# Patient Record
Sex: FEMALE | Race: WHITE
[De-identification: names, ages, dates, MRNs, and addresses within clinical notes are randomized per-mention and may not be internally consistent; named-entity substitution may affect disease eponyms.]

---

## 2017-03-25 ENCOUNTER — HOSPITAL ENCOUNTER (OUTPATIENT)
Dept: HOSPITAL 45 - C.LAB | Age: 67
Discharge: HOME | End: 2017-03-25
Attending: INTERNAL MEDICINE
Payer: COMMERCIAL

## 2017-03-25 DIAGNOSIS — E78.5: ICD-10-CM

## 2017-03-25 DIAGNOSIS — R19.7: Primary | ICD-10-CM

## 2017-03-25 LAB
BASOPHILS # BLD: 0.02 K/UL (ref 0–0.2)
BASOPHILS NFR BLD: 0.5 %
CHOLEST/HDLC SERPL: 3.1 {RATIO}
COMPLETE: YES
EOSINOPHIL NFR BLD AUTO: 185 K/UL (ref 130–400)
GLUCOSE UR QL: 58 MG/DL
HCT VFR BLD CALC: 40.3 % (ref 37–47)
IG%: 0.2 %
IMM GRANULOCYTES NFR BLD AUTO: 44.6 %
KETONES UR QL STRIP: 100 MG/DL
LYMPHOCYTES # BLD: 1.9 K/UL (ref 1.2–3.4)
MCH RBC QN AUTO: 32.4 PG (ref 25–34)
MCHC RBC AUTO-ENTMCNC: 33.7 G/DL (ref 32–36)
MCV RBC AUTO: 96 FL (ref 80–100)
MONOCYTES NFR BLD: 6.3 %
NEUTROPHILS # BLD AUTO: 1.9 %
NEUTROPHILS NFR BLD AUTO: 46.5 %
NITRITE UR QL STRIP: 106 MG/DL (ref 0–150)
PH UR: 179 MG/DL (ref 0–200)
PMV BLD AUTO: 12.2 FL (ref 7.4–10.4)
RBC # BLD AUTO: 4.2 M/UL (ref 4.2–5.4)
VERY LOW DENSITY LIPOPROT CALC: 21 MG/DL
WBC # BLD AUTO: 4.26 K/UL (ref 4.8–10.8)

## 2017-04-28 ENCOUNTER — HOSPITAL ENCOUNTER (OUTPATIENT)
Dept: HOSPITAL 45 - C.MAMM | Age: 67
Discharge: HOME | End: 2017-04-28
Attending: OBSTETRICS & GYNECOLOGY
Payer: COMMERCIAL

## 2017-04-28 DIAGNOSIS — Z12.31: Primary | ICD-10-CM

## 2017-05-01 NOTE — MAMMOGRAPHY REPORT
BILATERAL DIGITAL SCREENING MAMMOGRAM WITH CAD: 4/28/2017

CLINICAL HISTORY: Routine screening.  Patient has no complaints.  





TECHNIQUE:  Current study was also evaluated with a Computer Aided Detection (CAD) system.  Bilatera
l CC and MLO views were obtained.



COMPARISON: Comparison is made to exams dated:  11/9/2016 mammogram, 11/9/2016 ultrasound, 5/4/2016 
ultrasound biopsy, 5/4/2016 mammogram, 4/27/2016 mammogram, and 4/27/2016 ultrasound - [facility].   



BREAST COMPOSITION:  The tissue of both breasts is heterogeneously dense, which may obscure small ma
sses.  



FINDINGS:  No suspicious masses, calcifications, or areas of architectural distortion are noted in e
ither breast.  There are new post surgical changes including architectural distortion from surgical 
excision in the right upper outer quadrant, which presumably yielded benign pathology.  A linear sca
r marker denotes a scar on the right upper outer breast.  A biopsy marker clip is again noted in the
 right central breast.  Bilateral benign-appearing calcifications are not significantly changed.



IMPRESSION:  ACR BI-RADS CATEGORY 2: BENIGN

There is no mammographic evidence of malignancy. A 1 year screening mammogram is recommended.  The p
atient will receive written notification of the results.  





Approximately 10% of breast cancers are not detected with mammography. A negative mammographic repor
t should not delay biopsy if a clinically suggestive mass is present.



Tigist Quezada M.D.          

/:4/28/2017 16:12:17  



Imaging Technologist: Natasha WINSTON(R)(M), [facility]

letter sent: Normal 1/2  

BI-RADS Code: ACR BI-RADS Category 2: Benign

## 2017-05-04 ENCOUNTER — HOSPITAL ENCOUNTER (OUTPATIENT)
Dept: HOSPITAL 45 - C.PATHSPEC | Age: 67
Discharge: HOME | End: 2017-05-04
Attending: SURGERY
Payer: COMMERCIAL

## 2017-05-04 DIAGNOSIS — C44.612: Primary | ICD-10-CM

## 2017-10-05 ENCOUNTER — HOSPITAL ENCOUNTER (OUTPATIENT)
Dept: HOSPITAL 45 - C.LAB1850 | Age: 67
Discharge: HOME | End: 2017-10-05
Attending: INTERNAL MEDICINE
Payer: COMMERCIAL

## 2017-10-05 DIAGNOSIS — E78.5: Primary | ICD-10-CM

## 2017-10-05 LAB
ALBUMIN/GLOB SERPL: 1.2 {RATIO} (ref 0.9–2)
ALP SERPL-CCNC: 42 U/L (ref 45–117)
ALT SERPL-CCNC: 66 U/L (ref 12–78)
ANION GAP SERPL CALC-SCNC: 6 MMOL/L (ref 3–11)
AST SERPL-CCNC: 41 U/L (ref 15–37)
BUN SERPL-MCNC: 18 MG/DL (ref 7–18)
BUN/CREAT SERPL: 22.2 (ref 10–20)
CALCIUM SERPL-MCNC: 9.7 MG/DL (ref 8.5–10.1)
CHLORIDE SERPL-SCNC: 106 MMOL/L (ref 98–107)
CO2 SERPL-SCNC: 28 MMOL/L (ref 21–32)
CREAT SERPL-MCNC: 0.82 MG/DL (ref 0.6–1.2)
GLOBULIN SER-MCNC: 3.4 GM/DL (ref 2.5–4)
GLUCOSE SERPL-MCNC: 93 MG/DL (ref 70–99)
POTASSIUM SERPL-SCNC: 3.7 MMOL/L (ref 3.5–5.1)
SODIUM SERPL-SCNC: 140 MMOL/L (ref 136–145)

## 2018-03-21 ENCOUNTER — HOSPITAL ENCOUNTER (OUTPATIENT)
Dept: HOSPITAL 45 - C.RAD1850 | Age: 68
Discharge: HOME | End: 2018-03-21
Attending: INTERNAL MEDICINE
Payer: COMMERCIAL

## 2018-03-21 DIAGNOSIS — R10.9: Primary | ICD-10-CM

## 2018-03-21 NOTE — DIAGNOSTIC IMAGING REPORT
KUB



CLINICAL HISTORY: R10.9 Abdominal [id number] pain



COMPARISON STUDY:  No previous studies for comparison. 



FINDINGS: Nonobstructive bowel pattern. No evidence for fecal impaction. Several

small left renal calcifications. Right kidney is partially obscured due to

overlying bowel content. No acute process the osseous structures. Several pelvic

vascular calcifications.



IMPRESSION:  Several nonobstructing upper pole left renal calcifications.

Nonobstructive bowel pattern. 













The above report was generated using voice recognition software.  It may contain

grammatical, syntax or spelling errors.







Electronically signed by:  Bladimir Sanchez M.D.

3/21/2018 12:20 PM



Dictated Date/Time:  3/21/2018 12:19 PM

## 2018-04-30 ENCOUNTER — HOSPITAL ENCOUNTER (OUTPATIENT)
Dept: HOSPITAL 45 - C.MAMM | Age: 68
Discharge: HOME | End: 2018-04-30
Attending: OBSTETRICS & GYNECOLOGY
Payer: COMMERCIAL

## 2018-04-30 DIAGNOSIS — Z12.31: Primary | ICD-10-CM

## 2018-05-01 NOTE — MAMMOGRAPHY REPORT
BILATERAL DIGITAL SCREENING MAMMOGRAM TOMOSYNTHESIS WITH CAD: 4/30/2018

CLINICAL HISTORY: Routine screening.  Patient has no complaints.  





TECHNIQUE:  Breast tomosynthesis in addition to standard 2D mammography was performed. Current study 
was also evaluated with a Computer Aided Detection (CAD) system.  



COMPARISON: Comparison is made to exams dated:  4/28/2017 mammogram, 11/9/2016 mammogram, 11/9/2016 u
ltrasound, 5/4/2016 ultrasound biopsy, 5/4/2016 mammogram, and 4/27/2016 mammogram - [facility].   



BREAST COMPOSITION:  The tissue of both breasts is heterogeneously dense, which may obscure small mas
ses.  



FINDINGS: There is expected architectural distortion and skin irregularity in the upper outer middle 
one third of the right breast, at the site of prior excisional biopsy which yielded benign final surg
ical pathology results.  There is a stable ribbon-shaped biopsy marker clip in the central right fracisco
st.  Stable faint groupings of punctate microcalcifications bilaterally, and stable intramammary lymp
h nodes in each upper outer quadrant.  No new suspicious mass, architectural distortion or cluster of
 microcalcifications is seen.  



IMPRESSION:  ACR BI-RADS CATEGORY 1: NEGATIVE

There is no mammographic evidence of malignancy. A 1 year screening mammogram is recommended.  The pa
tient will receive written notification of the results.  





Approximately 10% of breast cancers are not detected with mammography. A negative mammographic report
 should not delay biopsy if a clinically suggestive mass is present.



Cheryl Hernandez M.D.          

ay/:4/30/2018 17:02:43  



Imaging Technologist: Asia WINSTON(R)(M), [facility]

letter sent: Normal 1/2  

BI-RADS Code: ACR BI-RADS Category 1: Negative